# Patient Record
Sex: MALE | Race: WHITE | NOT HISPANIC OR LATINO | URBAN - METROPOLITAN AREA
[De-identification: names, ages, dates, MRNs, and addresses within clinical notes are randomized per-mention and may not be internally consistent; named-entity substitution may affect disease eponyms.]

---

## 2019-12-05 PROBLEM — M17.0 PRIMARY OSTEOARTHRITIS OF BOTH KNEES: Status: ACTIVE | Noted: 2018-03-30

## 2019-12-05 PROBLEM — G47.33 OSA (OBSTRUCTIVE SLEEP APNEA): Status: ACTIVE | Noted: 2019-12-05

## 2019-12-05 PROBLEM — R94.31 ABNORMAL EKG: Status: ACTIVE | Noted: 2018-03-30

## 2021-03-02 PROBLEM — E87.5 HYPERKALEMIA: Status: ACTIVE | Noted: 2020-11-05

## 2021-03-02 PROBLEM — E87.1 HYPONATREMIA: Status: ACTIVE | Noted: 2020-11-05

## 2021-03-02 PROBLEM — N17.0 ACUTE RENAL FAILURE WITH TUBULAR NECROSIS: Status: ACTIVE | Noted: 2020-11-05

## 2021-03-02 PROBLEM — R11.2 NON-INTRACTABLE VOMITING WITH NAUSEA: Status: ACTIVE | Noted: 2020-11-05

## 2021-03-02 PROBLEM — A41.9 SEPSIS: Status: ACTIVE | Noted: 2020-11-05

## 2021-03-02 PROBLEM — S01.01XA LACERATION OF OCCIPITAL SCALP: Status: ACTIVE | Noted: 2018-03-30

## 2021-03-02 PROBLEM — R63.4 WEIGHT LOSS: Status: ACTIVE | Noted: 2018-03-30

## 2021-03-02 PROBLEM — N12 PYELONEPHRITIS: Status: ACTIVE | Noted: 2020-11-05

## 2021-03-02 PROBLEM — N17.9 ACUTE KIDNEY INJURY: Status: ACTIVE | Noted: 2020-11-18

## 2021-03-02 PROBLEM — E87.29 HIGH ANION GAP METABOLIC ACIDOSIS: Status: ACTIVE | Noted: 2020-11-05

## 2021-03-02 PROBLEM — N28.1 RENAL CYST: Status: ACTIVE | Noted: 2020-12-17

## 2021-03-02 PROBLEM — R80.9 PROTEINURIA: Status: ACTIVE | Noted: 2020-12-17

## 2021-03-02 PROBLEM — E86.1 HYPOVOLEMIA DEHYDRATION: Status: ACTIVE | Noted: 2018-03-30

## 2022-03-29 ENCOUNTER — PATIENT OUTREACH (OUTPATIENT)
Dept: ADMINISTRATIVE | Facility: HOSPITAL | Age: 72
End: 2022-03-29

## 2022-03-29 NOTE — PROGRESS NOTES
[facility] CMS chart audits, COLON CANCER SCREENING. Chart review completed for  test overdue (mammograms, Colonoscopies, pap smears, DM labs, and/or EYE EXAMs)      Care Everywhere and media, updates requested and reviewed.        Due for colon cancer screening.

## 2022-07-20 ENCOUNTER — PES CALL (OUTPATIENT)
Dept: ADMINISTRATIVE | Facility: CLINIC | Age: 72
End: 2022-07-20
Payer: MEDICARE

## 2022-07-21 ENCOUNTER — TELEPHONE (OUTPATIENT)
Dept: ADMINISTRATIVE | Facility: CLINIC | Age: 72
End: 2022-07-21
Payer: MEDICARE

## 2022-07-22 ENCOUNTER — TELEPHONE (OUTPATIENT)
Dept: ADMINISTRATIVE | Facility: CLINIC | Age: 72
End: 2022-07-22
Payer: MEDICARE

## 2024-02-14 LAB
LEFT EYE DM RETINOPATHY: NEGATIVE
RIGHT EYE DM RETINOPATHY: NEGATIVE

## 2024-04-16 ENCOUNTER — PATIENT OUTREACH (OUTPATIENT)
Dept: ADMINISTRATIVE | Facility: HOSPITAL | Age: 74
End: 2024-04-16
Payer: MEDICARE

## 2025-03-10 PROBLEM — E78.5 DYSLIPIDEMIA: Status: ACTIVE | Noted: 2020-11-18

## 2025-05-23 ENCOUNTER — PATIENT MESSAGE (OUTPATIENT)
Dept: RESEARCH | Facility: HOSPITAL | Age: 75
End: 2025-05-23
Payer: MEDICARE